# Patient Record
Sex: FEMALE | Race: WHITE | Employment: FULL TIME | URBAN - METROPOLITAN AREA
[De-identification: names, ages, dates, MRNs, and addresses within clinical notes are randomized per-mention and may not be internally consistent; named-entity substitution may affect disease eponyms.]

---

## 2020-01-17 ENCOUNTER — APPOINTMENT (OUTPATIENT)
Dept: LAB | Facility: CLINIC | Age: 50
End: 2020-01-17
Attending: OBSTETRICS & GYNECOLOGY
Payer: COMMERCIAL

## 2020-01-17 ENCOUNTER — OFFICE VISIT (OUTPATIENT)
Dept: OBGYN | Facility: CLINIC | Age: 50
End: 2020-01-17
Attending: OBSTETRICS & GYNECOLOGY
Payer: COMMERCIAL

## 2020-01-17 VITALS
BODY MASS INDEX: 29.86 KG/M2 | DIASTOLIC BLOOD PRESSURE: 91 MMHG | SYSTOLIC BLOOD PRESSURE: 138 MMHG | WEIGHT: 197 LBS | HEART RATE: 89 BPM | HEIGHT: 68 IN

## 2020-01-17 DIAGNOSIS — N93.9 ABNORMAL UTERINE BLEEDING (AUB): Primary | ICD-10-CM

## 2020-01-17 LAB
ERYTHROCYTE [DISTWIDTH] IN BLOOD BY AUTOMATED COUNT: 13.2 % (ref 10–15)
HCT VFR BLD AUTO: 41.9 % (ref 35–47)
HGB BLD-MCNC: 13.5 G/DL (ref 11.7–15.7)
MCH RBC QN AUTO: 31.7 PG (ref 26.5–33)
MCHC RBC AUTO-ENTMCNC: 32.2 G/DL (ref 31.5–36.5)
MCV RBC AUTO: 98 FL (ref 78–100)
PLATELET # BLD AUTO: 256 10E9/L (ref 150–450)
RBC # BLD AUTO: 4.26 10E12/L (ref 3.8–5.2)
TSH SERPL DL<=0.005 MIU/L-ACNC: 1.54 MU/L (ref 0.4–4)
WBC # BLD AUTO: 10.9 10E9/L (ref 4–11)

## 2020-01-17 PROCEDURE — 85027 COMPLETE CBC AUTOMATED: CPT | Performed by: OBSTETRICS & GYNECOLOGY

## 2020-01-17 PROCEDURE — 84443 ASSAY THYROID STIM HORMONE: CPT | Performed by: OBSTETRICS & GYNECOLOGY

## 2020-01-17 PROCEDURE — G0463 HOSPITAL OUTPT CLINIC VISIT: HCPCS | Mod: ZF

## 2020-01-17 PROCEDURE — 58100 BIOPSY OF UTERUS LINING: CPT | Mod: ZF | Performed by: OBSTETRICS & GYNECOLOGY

## 2020-01-17 PROCEDURE — 88305 TISSUE EXAM BY PATHOLOGIST: CPT | Performed by: OBSTETRICS & GYNECOLOGY

## 2020-01-17 PROCEDURE — 36415 COLL VENOUS BLD VENIPUNCTURE: CPT | Performed by: OBSTETRICS & GYNECOLOGY

## 2020-01-17 RX ORDER — MULTIPLE VITAMINS W/ MINERALS TAB 9MG-400MCG
1 TAB ORAL DAILY
COMMUNITY

## 2020-01-17 RX ORDER — LISINOPRIL 20 MG/1
20 TABLET ORAL
COMMUNITY
Start: 2019-12-12

## 2020-01-17 RX ORDER — TRETINOIN 0.25 MG/G
CREAM TOPICAL
COMMUNITY
Start: 2019-11-12

## 2020-01-17 RX ORDER — FLUOROURACIL 50 MG/G
CREAM TOPICAL
COMMUNITY
Start: 2019-09-23

## 2020-01-17 ASSESSMENT — ANXIETY QUESTIONNAIRES
1. FEELING NERVOUS, ANXIOUS, OR ON EDGE: NOT AT ALL
3. WORRYING TOO MUCH ABOUT DIFFERENT THINGS: NOT AT ALL
2. NOT BEING ABLE TO STOP OR CONTROL WORRYING: NOT AT ALL
GAD7 TOTAL SCORE: 0
IF YOU CHECKED OFF ANY PROBLEMS ON THIS QUESTIONNAIRE, HOW DIFFICULT HAVE THESE PROBLEMS MADE IT FOR YOU TO DO YOUR WORK, TAKE CARE OF THINGS AT HOME, OR GET ALONG WITH OTHER PEOPLE: NOT DIFFICULT AT ALL
7. FEELING AFRAID AS IF SOMETHING AWFUL MIGHT HAPPEN: NOT AT ALL
5. BEING SO RESTLESS THAT IT IS HARD TO SIT STILL: NOT AT ALL
6. BECOMING EASILY ANNOYED OR IRRITABLE: NOT AT ALL

## 2020-01-17 ASSESSMENT — PATIENT HEALTH QUESTIONNAIRE - PHQ9
5. POOR APPETITE OR OVEREATING: NOT AT ALL
SUM OF ALL RESPONSES TO PHQ QUESTIONS 1-9: 0

## 2020-01-17 ASSESSMENT — MIFFLIN-ST. JEOR: SCORE: 1567.09

## 2020-01-17 NOTE — LETTER
"2020       RE: Fay Amos  [address]  [place] [address]     Dear Colleague,    Thank you for referring your patient, Fay Amos, to the [facility] at [facility]. Please see a copy of my visit note below.    CC:  Consult for laparoscopy-assisted hysterectomy with bilateral salpingectomy, removal of Essure implants, and possible cystoscopy.    HPI:  Fay Amos is a 48 yo female,  with a PMH of HTN, presents to clinic for a surgical consult. Menses are irregular lasting 1-4 weeks with variable light to heavy bleeding. LMP not recorded, ~2w ago per pt. Pt reports taking an estrogen OCP; however, stopped taking ~2yr ago due to clotting risk. Menses have been irregular since. She had an ablation 11yrs and Essure implant.     Concerns:  1. Process, risks, and complications of surgery     Patients records are available and reviewed at today's visit.    Past GYN history: No STI   Last PAP smear:  10/21/2013 - Normal  Last TSH: 2018 - 1.83    Past Medical History:   Diagnosis Date     Hypertension        Past Surgical History:   Procedure Laterality Date     EYE SURGERY Left     Lazy eye correction       No family history on file.    Allergies: Erythromycin; Sulfa drugs; and Ciprofloxacin    Current Outpatient Medications   Medication Sig Dispense Refill     fluorouracil (EFUDEX) 5 % external cream Apply sparingly to the face two times a day for 3-4 weeks. Use plain Vaseline to aid in healing once treatment is compete.       lisinopril (PRINIVIL/ZESTRIL) 20 MG tablet Take 20 mg by mouth       multivitamin w/minerals (MULTI-VITAMIN) tablet Take 1 tablet by mouth daily       tretinoin (RETIN-A) 0.025 % external cream TOPICALLY APPLY A PEA SIZE AMOUNT TO FACE 20 MINUTS AFTER WASHING AT BEDTIME         ROS:  10-pt review of systems negative    EXAM:  Blood pressure (!) 138/91, pulse 89, height 1.727 m (5' 8\"), weight 89.4 kg (197 lb), not " currently breastfeeding.   BMI= Body mass index is 29.95 kg/m .     General - pleasant female in no acute distress.  Neck - supple without lymphadenopathy or thyromegaly.  Lungs - clear to auscultation bilaterally.  Heart - regular rate and rhythm without murmur.  Breast - Did not assess  Abdomen - soft, nontender, nondistended, no hepatosplenomegaly.  Pelvic - EG: normal adult female, BUS: within normal limits, Vagina: well rugated, no discharge, Cervix: no lesions or CMT, Uterus: firm, normal sized and nontender, Adnexae: no masses or tenderness.  Rectovaginal - deferred.  Musculoskeletal - no gross deformities.  Neurological - normal strength, sensation, and mental status.      ASSESSMENT:  Fay Amos is a 48 yo female,  with PMH of HTN, presents for a consult for abnormal uterine bleeding.  Patient desires definitive management and certain removal of Essure Implants.  Plan a laparoscopic hysterectomy with bilateral salpingectomy and possible cystoscopy.    Plan:  - Discussed treatment options, including non-surgical (Mirena) and surgical procedures (vaginal with laproscopic assistance vs robotic-assisted), including potential risks and side-effects.  Patient declines robotic surgery.  - Endometrial biopsy w/o cervical dilation to rule-out endometrial hyperplasia/cancer   - Labs: TSH w/free T4 reflex, CBC with Plt to identify possible anemia   -  Order US pelvic complete with transvaginal to be completed at outside clinic prior to surgery      Scotty Chen, MS3    Physician Attestation   I, Kalani Brady, was present with the medical student who participated in the service and in the documentation of the note.  I have verified the history and personally performed the physical exam and medical decision making.  I agree with the assessment and plan of care as documented in the note.      Items personally reviewed: vitals.    Kalani Brady MD    Endometrial Biopsy                                      "                                  Time Out - \"Pause for the Cause\"  Just before the procedure begins, through verbal and active participation of team members, verify:    Initials   Patient Name    mip   Patient Date of Birth mip   Procedure to be performed  mip   Site, laterality, level or multiples, noting patient position   mip   Relevant images or diagnostics available/displayed   mip   Implants, special equipment or special requirements        mip     Consent:  Verbal consent obtained from patient. , Risks, benefits of treatment, and no treatment were discussed.  Patient's questions were elicited and answered.  and Written consent signed and scanned into medical record.    Indication: metrorhagia  Faculty: Jose Antonio    Using a medium Graves speculum, the cervix was visualized. The cervix was prepped with Betadine.  A single tooth tenaculum was applied to the anterior lip of the cervix. The endometrial pipelle was advanced through the cervix without difficulty and a sample collected. No additional pass was made.  The tenaculum was removed from the cervix and the tenaculum site made hemostatic with Silver Nitrate sticks .    EBL: minimal  Complications:  None apparent  Pathology: EMB sample was sent to pathology.  Tolerance of Procedure:  Patient did tolerate the procedure well.     Patient was instructed to call if she experiences any heavy bleeding, severe cramping, or abnormal vaginal discharge.  May take ibuprofen 400-800 mg PO TID PRN or naproxen 500 mg PO BID for cramping.    Will notify patient of results.    Kalani Brady MD         "

## 2020-01-17 NOTE — NURSING NOTE
Chief Complaint   Patient presents with     Consult     Hysterectomy consult       See RIAN Wylie 1/17/2020

## 2020-01-17 NOTE — PROGRESS NOTES
"CC:  Consult for laparoscopy-assisted hysterectomy with bilateral salpingectomy, removal of Essure implants, and possible cystoscopy.    HPI:  Fay Amos is a 48 yo female,  with a PMH of HTN, presents to clinic for a surgical consult. Menses are irregular lasting 1-4 weeks with variable light to heavy bleeding. LMP not recorded, ~2w ago per pt. Pt reports taking an estrogen OCP; however, stopped taking ~2yr ago due to clotting risk. Menses have been irregular since. She had an ablation 11yrs and Essure implant.     Concerns:  1. Process, risks, and complications of surgery     Patients records are available and reviewed at today's visit.    Past GYN history: No STI   Last PAP smear:  10/21/2013 - Normal  Last TSH: 2018 - 1.83    Past Medical History:   Diagnosis Date     Hypertension        Past Surgical History:   Procedure Laterality Date     EYE SURGERY Left     Lazy eye correction       No family history on file.    Allergies: Erythromycin; Sulfa drugs; and Ciprofloxacin    Current Outpatient Medications   Medication Sig Dispense Refill     fluorouracil (EFUDEX) 5 % external cream Apply sparingly to the face two times a day for 3-4 weeks. Use plain Vaseline to aid in healing once treatment is compete.       lisinopril (PRINIVIL/ZESTRIL) 20 MG tablet Take 20 mg by mouth       multivitamin w/minerals (MULTI-VITAMIN) tablet Take 1 tablet by mouth daily       tretinoin (RETIN-A) 0.025 % external cream TOPICALLY APPLY A PEA SIZE AMOUNT TO FACE 20 MINUTS AFTER WASHING AT BEDTIME         ROS:  10-pt review of systems negative    EXAM:  Blood pressure (!) 138/91, pulse 89, height 1.727 m (5' 8\"), weight 89.4 kg (197 lb), not currently breastfeeding.   BMI= Body mass index is 29.95 kg/m .     General - pleasant female in no acute distress.  Neck - supple without lymphadenopathy or thyromegaly.  Lungs - clear to auscultation bilaterally.  Heart - regular rate and rhythm without murmur.  Breast - Did not " "assess  Abdomen - soft, nontender, nondistended, no hepatosplenomegaly.  Pelvic - EG: normal adult female, BUS: within normal limits, Vagina: well rugated, no discharge, Cervix: no lesions or CMT, Uterus: firm, normal sized and nontender, Adnexae: no masses or tenderness.  Rectovaginal - deferred.  Musculoskeletal - no gross deformities.  Neurological - normal strength, sensation, and mental status.      ASSESSMENT:  Fay Amos is a 48 yo female,  with PMH of HTN, presents for a consult for abnormal uterine bleeding.  Patient desires definitive management and certain removal of Essure Implants.  Plan a laparoscopic hysterectomy with bilateral salpingectomy and possible cystoscopy.    Plan:  - Discussed treatment options, including non-surgical (Mirena) and surgical procedures (vaginal with laproscopic assistance vs robotic-assisted), including potential risks and side-effects.  Patient declines robotic surgery.  - Endometrial biopsy w/o cervical dilation to rule-out endometrial hyperplasia/cancer   - Labs: TSH w/free T4 reflex, CBC with Plt to identify possible anemia   -  Order US pelvic complete with transvaginal to be completed at outside clinic prior to surgery      Scotty Chen, MS3    Physician Attestation   I, Kalani Brady, was present with the medical student who participated in the service and in the documentation of the note.  I have verified the history and personally performed the physical exam and medical decision making.  I agree with the assessment and plan of care as documented in the note.      Items personally reviewed: vitals.    Kalani Brady MD    Endometrial Biopsy                                                                       Time Out - \"Pause for the Cause\"  Just before the procedure begins, through verbal and active participation of team members, verify:    Initials   Patient Name    mip   Patient Date of Birth mip   Procedure to be performed  mip   Site, laterality, " level or multiples, noting patient position   mip   Relevant images or diagnostics available/displayed   mip   Implants, special equipment or special requirements        mip     Consent:  Verbal consent obtained from patient. , Risks, benefits of treatment, and no treatment were discussed.  Patient's questions were elicited and answered.  and Written consent signed and scanned into medical record.    Indication: metrorhagia  Faculty: Jose Antonio      Using a medium Graves speculum, the cervix was visualized. The cervix was prepped with Betadine.  A single tooth tenaculum was applied to the anterior lip of the cervix. The endometrial pipelle was advanced through the cervix without difficulty and a sample collected. No additional pass was made.  The tenaculum was removed from the cervix and the tenaculum site made hemostatic with Silver Nitrate sticks .    EBL: minimal  Complications:  None apparent  Pathology: EMB sample was sent to pathology.  Tolerance of Procedure:  Patient did tolerate the procedure well.     Patient was instructed to call if she experiences any heavy bleeding, severe cramping, or abnormal vaginal discharge.  May take ibuprofen 400-800 mg PO TID PRN or naproxen 500 mg PO BID for cramping.    Will notify patient of results.    Kalani Brady MD

## 2020-01-18 ASSESSMENT — ANXIETY QUESTIONNAIRES: GAD7 TOTAL SCORE: 0

## 2020-01-20 ENCOUNTER — TELEPHONE (OUTPATIENT)
Dept: OBGYN | Facility: CLINIC | Age: 50
End: 2020-01-20

## 2020-01-20 PROBLEM — N93.9 ABNORMAL UTERINE BLEEDING (AUB): Status: ACTIVE | Noted: 2020-01-20

## 2020-01-20 NOTE — TELEPHONE ENCOUNTER
Confirmed surgery date, time and location 2/21/20 with arrival time at 7:30a.m with nothing to eat eight hours before scheduled surgery time and clear liquids up to two hours before scheduled surgery time, informed patient that a surgery map and letter will be mailed out.     to complete the following fields:            CHECKLIST     Google Calendar : Yes     Resident notified:Not Applicable     Clinic schedule blocked: Not Applicable    Patient notified:Yes      Pre op information sent: Yes     Given to patient over the phone.Yes    Comments:

## 2020-01-21 LAB — COPATH REPORT: NORMAL

## 2020-01-23 ENCOUNTER — TRANSFERRED RECORDS (OUTPATIENT)
Dept: HEALTH INFORMATION MANAGEMENT | Facility: CLINIC | Age: 50
End: 2020-01-23

## 2020-02-20 ENCOUNTER — ANESTHESIA EVENT (OUTPATIENT)
Dept: SURGERY | Facility: CLINIC | Age: 50
End: 2020-02-20
Payer: COMMERCIAL

## 2020-02-20 RX ORDER — ACETAMINOPHEN 325 MG/1
325-650 TABLET ORAL EVERY 6 HOURS PRN
Status: ON HOLD | COMMUNITY
End: 2020-02-21

## 2020-02-20 RX ORDER — OMEGA-3 FATTY ACIDS/FISH OIL 300-1000MG
200 CAPSULE ORAL EVERY 4 HOURS PRN
Status: ON HOLD | COMMUNITY
End: 2020-02-21

## 2020-02-20 RX ADMIN — BUPIVACAINE HYDROCHLORIDE AND EPINEPHRINE BITARTRATE 60 ML: 2.5; .005 INJECTION, SOLUTION INFILTRATION; PERINEURAL at 08:59

## 2020-02-21 ENCOUNTER — ANESTHESIA (OUTPATIENT)
Dept: SURGERY | Facility: CLINIC | Age: 50
End: 2020-02-21
Payer: COMMERCIAL

## 2020-02-21 ENCOUNTER — ANCILLARY PROCEDURE (OUTPATIENT)
Dept: ULTRASOUND IMAGING | Facility: CLINIC | Age: 50
End: 2020-02-21
Attending: ANESTHESIOLOGY
Payer: COMMERCIAL

## 2020-02-21 ENCOUNTER — HOSPITAL ENCOUNTER (OUTPATIENT)
Facility: CLINIC | Age: 50
Discharge: HOME | End: 2020-02-21
Attending: OBSTETRICS & GYNECOLOGY | Admitting: OBSTETRICS & GYNECOLOGY
Payer: COMMERCIAL

## 2020-02-21 ENCOUNTER — DOCUMENTATION ONLY (OUTPATIENT)
Dept: OTHER | Facility: CLINIC | Age: 50
End: 2020-02-21

## 2020-02-21 VITALS
HEART RATE: 84 BPM | OXYGEN SATURATION: 97 % | HEIGHT: 68 IN | DIASTOLIC BLOOD PRESSURE: 86 MMHG | SYSTOLIC BLOOD PRESSURE: 137 MMHG | RESPIRATION RATE: 11 BRPM | WEIGHT: 187.17 LBS | TEMPERATURE: 98.1 F | BODY MASS INDEX: 28.37 KG/M2

## 2020-02-21 DIAGNOSIS — N93.9 ABNORMAL UTERINE BLEEDING (AUB): ICD-10-CM

## 2020-02-21 LAB
ABO + RH BLD: NORMAL
ABO + RH BLD: NORMAL
BLD GP AB SCN SERPL QL: NORMAL
BLOOD BANK CMNT PATIENT-IMP: NORMAL
GLUCOSE BLDC GLUCOMTR-MCNC: 88 MG/DL (ref 70–99)
HCG UR QL: NEGATIVE
HGB BLD-MCNC: 13.3 G/DL (ref 11.7–15.7)
SPECIMEN EXP DATE BLD: NORMAL

## 2020-02-21 PROCEDURE — 82962 GLUCOSE BLOOD TEST: CPT

## 2020-02-21 PROCEDURE — 27210794 ZZH OR GENERAL SUPPLY STERILE: Performed by: OBSTETRICS & GYNECOLOGY

## 2020-02-21 PROCEDURE — 25000128 H RX IP 250 OP 636: Performed by: ANESTHESIOLOGY

## 2020-02-21 PROCEDURE — 86850 RBC ANTIBODY SCREEN: CPT | Performed by: OBSTETRICS & GYNECOLOGY

## 2020-02-21 PROCEDURE — 25000125 ZZHC RX 250: Performed by: OBSTETRICS & GYNECOLOGY

## 2020-02-21 PROCEDURE — 71000015 ZZH RECOVERY PHASE 1 LEVEL 2 EA ADDTL HR: Performed by: OBSTETRICS & GYNECOLOGY

## 2020-02-21 PROCEDURE — 25000125 ZZHC RX 250: Performed by: NURSE ANESTHETIST, CERTIFIED REGISTERED

## 2020-02-21 PROCEDURE — 25000132 ZZH RX MED GY IP 250 OP 250 PS 637: Performed by: OBSTETRICS & GYNECOLOGY

## 2020-02-21 PROCEDURE — 25000132 ZZH RX MED GY IP 250 OP 250 PS 637: Performed by: ANESTHESIOLOGY

## 2020-02-21 PROCEDURE — 36000062 ZZH SURGERY LEVEL 4 1ST 30 MIN - UMMC: Performed by: OBSTETRICS & GYNECOLOGY

## 2020-02-21 PROCEDURE — 36415 COLL VENOUS BLD VENIPUNCTURE: CPT | Performed by: OBSTETRICS & GYNECOLOGY

## 2020-02-21 PROCEDURE — 25000128 H RX IP 250 OP 636: Performed by: NURSE ANESTHETIST, CERTIFIED REGISTERED

## 2020-02-21 PROCEDURE — 25000565 ZZH ISOFLURANE, EA 15 MIN: Performed by: OBSTETRICS & GYNECOLOGY

## 2020-02-21 PROCEDURE — 37000008 ZZH ANESTHESIA TECHNICAL FEE, 1ST 30 MIN: Performed by: OBSTETRICS & GYNECOLOGY

## 2020-02-21 PROCEDURE — 25000132 ZZH RX MED GY IP 250 OP 250 PS 637: Performed by: STUDENT IN AN ORGANIZED HEALTH CARE EDUCATION/TRAINING PROGRAM

## 2020-02-21 PROCEDURE — 86900 BLOOD TYPING SEROLOGIC ABO: CPT | Performed by: OBSTETRICS & GYNECOLOGY

## 2020-02-21 PROCEDURE — 86901 BLOOD TYPING SEROLOGIC RH(D): CPT | Performed by: OBSTETRICS & GYNECOLOGY

## 2020-02-21 PROCEDURE — 88307 TISSUE EXAM BY PATHOLOGIST: CPT | Performed by: OBSTETRICS & GYNECOLOGY

## 2020-02-21 PROCEDURE — 40000170 ZZH STATISTIC PRE-PROCEDURE ASSESSMENT II: Performed by: OBSTETRICS & GYNECOLOGY

## 2020-02-21 PROCEDURE — 25800030 ZZH RX IP 258 OP 636: Performed by: NURSE ANESTHETIST, CERTIFIED REGISTERED

## 2020-02-21 PROCEDURE — 36000064 ZZH SURGERY LEVEL 4 EA 15 ADDTL MIN - UMMC: Performed by: OBSTETRICS & GYNECOLOGY

## 2020-02-21 PROCEDURE — 85018 HEMOGLOBIN: CPT | Performed by: OBSTETRICS & GYNECOLOGY

## 2020-02-21 PROCEDURE — 71000027 ZZH RECOVERY PHASE 2 EACH 15 MINS: Performed by: OBSTETRICS & GYNECOLOGY

## 2020-02-21 PROCEDURE — 25000125 ZZHC RX 250: Performed by: ANESTHESIOLOGY

## 2020-02-21 PROCEDURE — 25000128 H RX IP 250 OP 636: Performed by: OBSTETRICS & GYNECOLOGY

## 2020-02-21 PROCEDURE — 37000009 ZZH ANESTHESIA TECHNICAL FEE, EACH ADDTL 15 MIN: Performed by: OBSTETRICS & GYNECOLOGY

## 2020-02-21 PROCEDURE — 81025 URINE PREGNANCY TEST: CPT | Performed by: OBSTETRICS & GYNECOLOGY

## 2020-02-21 PROCEDURE — 71000014 ZZH RECOVERY PHASE 1 LEVEL 2 FIRST HR: Performed by: OBSTETRICS & GYNECOLOGY

## 2020-02-21 RX ORDER — CEFAZOLIN SODIUM 2 G/100ML
2 INJECTION, SOLUTION INTRAVENOUS
Status: COMPLETED | OUTPATIENT
Start: 2020-02-21 | End: 2020-02-21

## 2020-02-21 RX ORDER — CEFAZOLIN SODIUM 1 G/3ML
1 INJECTION, POWDER, FOR SOLUTION INTRAMUSCULAR; INTRAVENOUS SEE ADMIN INSTRUCTIONS
Status: DISCONTINUED | OUTPATIENT
Start: 2020-02-21 | End: 2020-02-21 | Stop reason: HOSPADM

## 2020-02-21 RX ORDER — LIDOCAINE 40 MG/G
CREAM TOPICAL
Status: DISCONTINUED | OUTPATIENT
Start: 2020-02-21 | End: 2020-02-21

## 2020-02-21 RX ORDER — DEXAMETHASONE SODIUM PHOSPHATE 4 MG/ML
INJECTION, SOLUTION INTRA-ARTICULAR; INTRALESIONAL; INTRAMUSCULAR; INTRAVENOUS; SOFT TISSUE PRN
Status: DISCONTINUED | OUTPATIENT
Start: 2020-02-21 | End: 2020-02-21

## 2020-02-21 RX ORDER — FENTANYL CITRATE 50 UG/ML
25-50 INJECTION, SOLUTION INTRAMUSCULAR; INTRAVENOUS
Status: DISCONTINUED | OUTPATIENT
Start: 2020-02-21 | End: 2020-02-21 | Stop reason: HOSPADM

## 2020-02-21 RX ORDER — FENTANYL CITRATE 50 UG/ML
INJECTION, SOLUTION INTRAMUSCULAR; INTRAVENOUS PRN
Status: DISCONTINUED | OUTPATIENT
Start: 2020-02-21 | End: 2020-02-21

## 2020-02-21 RX ORDER — SODIUM CHLORIDE, SODIUM LACTATE, POTASSIUM CHLORIDE, CALCIUM CHLORIDE 600; 310; 30; 20 MG/100ML; MG/100ML; MG/100ML; MG/100ML
INJECTION, SOLUTION INTRAVENOUS CONTINUOUS
Status: CANCELLED | OUTPATIENT
Start: 2020-02-21

## 2020-02-21 RX ORDER — ONDANSETRON 4 MG/1
4 TABLET, ORALLY DISINTEGRATING ORAL
Status: DISCONTINUED | OUTPATIENT
Start: 2020-02-21 | End: 2020-02-21 | Stop reason: HOSPADM

## 2020-02-21 RX ORDER — OXYCODONE HYDROCHLORIDE 5 MG/1
5 TABLET ORAL EVERY 6 HOURS PRN
Qty: 12 TABLET | Refills: 0 | Status: SHIPPED | OUTPATIENT
Start: 2020-02-21 | End: 2020-10-29

## 2020-02-21 RX ORDER — ONDANSETRON 4 MG/1
4 TABLET, ORALLY DISINTEGRATING ORAL EVERY 30 MIN PRN
Status: DISCONTINUED | OUTPATIENT
Start: 2020-02-21 | End: 2020-02-21 | Stop reason: HOSPADM

## 2020-02-21 RX ORDER — ALBUTEROL SULFATE 0.83 MG/ML
2.5 SOLUTION RESPIRATORY (INHALATION) EVERY 4 HOURS PRN
Status: DISCONTINUED | OUTPATIENT
Start: 2020-02-21 | End: 2020-02-21 | Stop reason: HOSPADM

## 2020-02-21 RX ORDER — ACETAMINOPHEN 500 MG
500-1000 TABLET ORAL EVERY 6 HOURS PRN
COMMUNITY
Start: 2020-02-21 | End: 2020-10-29

## 2020-02-21 RX ORDER — PROPOFOL 10 MG/ML
INJECTION, EMULSION INTRAVENOUS PRN
Status: DISCONTINUED | OUTPATIENT
Start: 2020-02-21 | End: 2020-02-21

## 2020-02-21 RX ORDER — DEXAMETHASONE SODIUM PHOSPHATE 10 MG/ML
INJECTION, SOLUTION INTRAMUSCULAR; INTRAVENOUS PRN
Status: DISCONTINUED | OUTPATIENT
Start: 2020-02-21 | End: 2020-02-21

## 2020-02-21 RX ORDER — IBUPROFEN 600 MG/1
600 TABLET, FILM COATED ORAL EVERY 6 HOURS PRN
Qty: 30 TABLET | Refills: 0 | COMMUNITY
Start: 2020-02-21 | End: 2020-10-29

## 2020-02-21 RX ORDER — BUPIVACAINE HYDROCHLORIDE AND EPINEPHRINE 2.5; 5 MG/ML; UG/ML
INJECTION, SOLUTION INFILTRATION; PERINEURAL PRN
Status: DISCONTINUED | OUTPATIENT
Start: 2020-02-20 | End: 2020-02-21

## 2020-02-21 RX ORDER — ACETAMINOPHEN 325 MG/1
650 TABLET ORAL EVERY 4 HOURS PRN
COMMUNITY
Start: 2020-02-21 | End: 2020-02-21

## 2020-02-21 RX ORDER — ACETAMINOPHEN 325 MG/1
975 TABLET ORAL
Status: DISCONTINUED | OUTPATIENT
Start: 2020-02-21 | End: 2020-02-21 | Stop reason: HOSPADM

## 2020-02-21 RX ORDER — KETOROLAC TROMETHAMINE 30 MG/ML
INJECTION, SOLUTION INTRAMUSCULAR; INTRAVENOUS PRN
Status: DISCONTINUED | OUTPATIENT
Start: 2020-02-21 | End: 2020-02-21

## 2020-02-21 RX ORDER — ACETAMINOPHEN 325 MG/1
975 TABLET ORAL ONCE
Status: COMPLETED | OUTPATIENT
Start: 2020-02-21 | End: 2020-02-21

## 2020-02-21 RX ORDER — NALOXONE HYDROCHLORIDE 0.4 MG/ML
.1-.4 INJECTION, SOLUTION INTRAMUSCULAR; INTRAVENOUS; SUBCUTANEOUS
Status: DISCONTINUED | OUTPATIENT
Start: 2020-02-21 | End: 2020-02-21 | Stop reason: HOSPADM

## 2020-02-21 RX ORDER — FLUMAZENIL 0.1 MG/ML
0.2 INJECTION, SOLUTION INTRAVENOUS
Status: DISCONTINUED | OUTPATIENT
Start: 2020-02-21 | End: 2020-02-21 | Stop reason: HOSPADM

## 2020-02-21 RX ORDER — SODIUM CHLORIDE, SODIUM LACTATE, POTASSIUM CHLORIDE, CALCIUM CHLORIDE 600; 310; 30; 20 MG/100ML; MG/100ML; MG/100ML; MG/100ML
INJECTION, SOLUTION INTRAVENOUS CONTINUOUS
Status: DISCONTINUED | OUTPATIENT
Start: 2020-02-21 | End: 2020-02-21 | Stop reason: HOSPADM

## 2020-02-21 RX ORDER — PHENAZOPYRIDINE HYDROCHLORIDE 200 MG/1
200 TABLET, FILM COATED ORAL ONCE
Status: COMPLETED | OUTPATIENT
Start: 2020-02-21 | End: 2020-02-21

## 2020-02-21 RX ORDER — LIDOCAINE HYDROCHLORIDE AND EPINEPHRINE 10; 10 MG/ML; UG/ML
INJECTION, SOLUTION INFILTRATION; PERINEURAL PRN
Status: DISCONTINUED | OUTPATIENT
Start: 2020-02-21 | End: 2020-02-21 | Stop reason: HOSPADM

## 2020-02-21 RX ORDER — HYDRALAZINE HYDROCHLORIDE 20 MG/ML
2.5-5 INJECTION INTRAMUSCULAR; INTRAVENOUS EVERY 10 MIN PRN
Status: DISCONTINUED | OUTPATIENT
Start: 2020-02-21 | End: 2020-02-21 | Stop reason: HOSPADM

## 2020-02-21 RX ORDER — METOPROLOL TARTRATE 1 MG/ML
1-2 INJECTION, SOLUTION INTRAVENOUS EVERY 5 MIN PRN
Status: DISCONTINUED | OUTPATIENT
Start: 2020-02-21 | End: 2020-02-21 | Stop reason: HOSPADM

## 2020-02-21 RX ORDER — ONDANSETRON 2 MG/ML
INJECTION INTRAMUSCULAR; INTRAVENOUS PRN
Status: DISCONTINUED | OUTPATIENT
Start: 2020-02-21 | End: 2020-02-21

## 2020-02-21 RX ORDER — LIDOCAINE HYDROCHLORIDE 20 MG/ML
INJECTION, SOLUTION INFILTRATION; PERINEURAL PRN
Status: DISCONTINUED | OUTPATIENT
Start: 2020-02-21 | End: 2020-02-21

## 2020-02-21 RX ORDER — GABAPENTIN 300 MG/1
300 CAPSULE ORAL
Status: COMPLETED | OUTPATIENT
Start: 2020-02-21 | End: 2020-02-21

## 2020-02-21 RX ORDER — SODIUM CHLORIDE, SODIUM LACTATE, POTASSIUM CHLORIDE, CALCIUM CHLORIDE 600; 310; 30; 20 MG/100ML; MG/100ML; MG/100ML; MG/100ML
INJECTION, SOLUTION INTRAVENOUS CONTINUOUS PRN
Status: DISCONTINUED | OUTPATIENT
Start: 2020-02-21 | End: 2020-02-21

## 2020-02-21 RX ORDER — AMOXICILLIN 250 MG
1-2 CAPSULE ORAL 2 TIMES DAILY
Qty: 30 TABLET | Refills: 0 | COMMUNITY
Start: 2020-02-21 | End: 2020-10-29

## 2020-02-21 RX ORDER — OXYCODONE HYDROCHLORIDE 5 MG/1
5 TABLET ORAL
Status: COMPLETED | OUTPATIENT
Start: 2020-02-21 | End: 2020-02-21

## 2020-02-21 RX ORDER — MEPERIDINE HYDROCHLORIDE 25 MG/ML
12.5 INJECTION INTRAMUSCULAR; INTRAVENOUS; SUBCUTANEOUS
Status: DISCONTINUED | OUTPATIENT
Start: 2020-02-21 | End: 2020-02-21 | Stop reason: HOSPADM

## 2020-02-21 RX ORDER — HYDROMORPHONE HYDROCHLORIDE 1 MG/ML
.3-.5 INJECTION, SOLUTION INTRAMUSCULAR; INTRAVENOUS; SUBCUTANEOUS EVERY 10 MIN PRN
Status: DISCONTINUED | OUTPATIENT
Start: 2020-02-21 | End: 2020-02-21 | Stop reason: HOSPADM

## 2020-02-21 RX ORDER — ACETAMINOPHEN 325 MG/1
650 TABLET ORAL
Status: DISCONTINUED | OUTPATIENT
Start: 2020-02-21 | End: 2020-02-21 | Stop reason: HOSPADM

## 2020-02-21 RX ORDER — ONDANSETRON 2 MG/ML
4 INJECTION INTRAMUSCULAR; INTRAVENOUS EVERY 30 MIN PRN
Status: DISCONTINUED | OUTPATIENT
Start: 2020-02-21 | End: 2020-02-21 | Stop reason: HOSPADM

## 2020-02-21 RX ORDER — DIMENHYDRINATE 50 MG/ML
25 INJECTION, SOLUTION INTRAMUSCULAR; INTRAVENOUS
Status: DISCONTINUED | OUTPATIENT
Start: 2020-02-21 | End: 2020-02-21 | Stop reason: HOSPADM

## 2020-02-21 RX ADMIN — HYDROMORPHONE HYDROCHLORIDE 0.5 MG: 1 INJECTION, SOLUTION INTRAMUSCULAR; INTRAVENOUS; SUBCUTANEOUS at 10:22

## 2020-02-21 RX ADMIN — DEXAMETHASONE SODIUM PHOSPHATE 8 MG: 4 INJECTION, SOLUTION INTRAMUSCULAR; INTRAVENOUS at 09:46

## 2020-02-21 RX ADMIN — OXYCODONE HYDROCHLORIDE 5 MG: 5 TABLET ORAL at 14:03

## 2020-02-21 RX ADMIN — DEXMEDETOMIDINE 40 MCG: 100 INJECTION, SOLUTION, CONCENTRATE INTRAVENOUS at 08:59

## 2020-02-21 RX ADMIN — FENTANYL CITRATE 100 MCG: 50 INJECTION, SOLUTION INTRAMUSCULAR; INTRAVENOUS at 09:20

## 2020-02-21 RX ADMIN — CEFAZOLIN 2 G: 10 INJECTION, POWDER, FOR SOLUTION INTRAVENOUS at 09:35

## 2020-02-21 RX ADMIN — GABAPENTIN 300 MG: 300 CAPSULE ORAL at 08:10

## 2020-02-21 RX ADMIN — KETOROLAC TROMETHAMINE 30 MG: 30 INJECTION, SOLUTION INTRAMUSCULAR at 11:45

## 2020-02-21 RX ADMIN — FENTANYL CITRATE 50 MCG: 50 INJECTION, SOLUTION INTRAMUSCULAR; INTRAVENOUS at 08:56

## 2020-02-21 RX ADMIN — PROPOFOL 170 MG: 10 INJECTION, EMULSION INTRAVENOUS at 09:25

## 2020-02-21 RX ADMIN — SUGAMMADEX 200 MG: 100 INJECTION, SOLUTION INTRAVENOUS at 11:45

## 2020-02-21 RX ADMIN — SODIUM CHLORIDE, POTASSIUM CHLORIDE, SODIUM LACTATE AND CALCIUM CHLORIDE: 600; 310; 30; 20 INJECTION, SOLUTION INTRAVENOUS at 10:05

## 2020-02-21 RX ADMIN — ACETAMINOPHEN 650 MG: 325 TABLET, FILM COATED ORAL at 14:03

## 2020-02-21 RX ADMIN — DEXAMETHASONE SODIUM PHOSPHATE 2 MG: 10 INJECTION, SOLUTION INTRAMUSCULAR; INTRAVENOUS at 08:59

## 2020-02-21 RX ADMIN — SODIUM CHLORIDE, POTASSIUM CHLORIDE, SODIUM LACTATE AND CALCIUM CHLORIDE: 600; 310; 30; 20 INJECTION, SOLUTION INTRAVENOUS at 09:12

## 2020-02-21 RX ADMIN — ACETAMINOPHEN 975 MG: 325 TABLET, FILM COATED ORAL at 08:09

## 2020-02-21 RX ADMIN — ROCURONIUM BROMIDE 20 MG: 10 INJECTION INTRAVENOUS at 09:46

## 2020-02-21 RX ADMIN — PHENAZOPYRIDINE HYDROCHLORIDE 200 MG: 200 TABLET, FILM COATED ORAL at 08:10

## 2020-02-21 RX ADMIN — MIDAZOLAM HYDROCHLORIDE 1 MG: 1 INJECTION, SOLUTION INTRAMUSCULAR; INTRAVENOUS at 08:56

## 2020-02-21 RX ADMIN — ONDANSETRON 4 MG: 2 INJECTION INTRAMUSCULAR; INTRAVENOUS at 11:52

## 2020-02-21 RX ADMIN — LIDOCAINE HYDROCHLORIDE 60 MG: 20 INJECTION, SOLUTION INFILTRATION; PERINEURAL at 09:25

## 2020-02-21 RX ADMIN — ROCURONIUM BROMIDE 50 MG: 10 INJECTION INTRAVENOUS at 09:25

## 2020-02-21 RX ADMIN — MIDAZOLAM 2 MG: 1 INJECTION INTRAMUSCULAR; INTRAVENOUS at 09:12

## 2020-02-21 ASSESSMENT — MIFFLIN-ST. JEOR: SCORE: 1522.37

## 2020-02-21 NOTE — ANESTHESIA POSTPROCEDURE EVALUATION
Anesthesia POST Procedure Evaluation    Patient: Fay Amos   MRN:     [id number] Gender:   female   Age:    49 year old :      1970        Preoperative Diagnosis: Abnormal uterine bleeding (AUB) [N93.9]   Procedure(s):  HYSTERECTOMY, VAGINAL, LAPAROSCOPY-ASSISTED, BILATERAL SALPINGECTOMY  CYSTOSCOPY  REMOVAL OF ESSURE IMPLANTS   Postop Comments: No value filed.     Anesthesia Type: General       Disposition: Outpatient   Postop Pain Control: Uneventful            Sign Out: Well controlled pain   PONV: No   Neuro/Psych: Uneventful            Sign Out: Acceptable/Baseline neuro status   Airway/Respiratory: Uneventful            Sign Out: Acceptable/Baseline resp. status   CV/Hemodynamics: Uneventful            Sign Out: Acceptable CV status   Other NRE: NONE   DID A NON-ROUTINE EVENT OCCUR? No         Last Anesthesia Record Vitals:  CRNA VITALS  2020 1132 - 2020 1232      2020             Temp:  36.8  C (98.2  F)          Last PACU Vitals:  Vitals Value Taken Time   /87 2020  1:15 PM   Temp 36.7  C (98.1  F) 2020  1:00 PM   Pulse 80 2020  1:15 PM   Resp 14 2020  1:20 PM   SpO2 98 % 2020  1:20 PM   Temp src     NIBP     Pulse     SpO2     Resp     Temp     Ht Rate     Temp 2     Vitals shown include unvalidated device data.      Electronically Signed By: Soraya Menard MD, 2020, 1:22 PM

## 2020-02-21 NOTE — OP NOTE
Operative Note  20    Patient: Fay Amos  : 1970  MRN: [id number]    Procedure Name: Laparoscopic Assisted Vaginal Hysterectomy, Bilateral Salpingectomy, Removal of Essure Implants (In situ within the fallopian tubes), Cystoscopy    Surgeon(s) and Role:     * Kalani Brady MD - Primary     * Johana Sandy MD - Resident - Assisting    Anesthesia:     General  IVF: 1300mL     Estimated blood loss:  200 ml  Drains: None  UOP: 100mL      Specimens:     Uterus, cervix, bilateral fallopian tubes.   Findings:                     On EUA, patient had an 8w sized mobile anteverted uterus. Normal external genitalia, vagina, cervix. On laparoscopy, no injury to underlying viscera. Her liver, bowel, tubes and ovaries were grossly normal in appearance.  There were no adhesions in the pelvis.  Bilateral ureters were visualized and noted to be clear of the area of surgical excision. On cystoscopy, efflux from bilateral ureters visualized. Normal bladder mucosa without injury. Surgical sites hemostatic at the end of the case.     Indications: Fay Amos is a 49 year old with history of abnormal uterine bleeding. She recently stopped OCPs due to risk of DVT. She has history of Essure placement and endometrial ablation 11 years ago. A pelivc US showed adenomyosis. Endometrial biopsy showed late secretory endometrium, negative for hyperplasia or atypia. Risks/benefits/alternatives to above procedure were discussed. Signed informed consent was obtained.     Procedure Details:  Patient was brought to the operating room where adequate general anesthesia was administered after SCDs were in place. She was placed in the dorsal lithotomy position, and exam under anesthesia revealed the findings noted above. She was prepped and draped in the usual sterile fashion, and gordon catheter was placed. A speculum was placed and the cervix was visualized, grasped at 12 o'clock with single tooth tenaculum. An acorn manipulator  was placed. Attention was turned to the abdomen. A 5mm incision was made in the umbilicus. A Veress needle was placed and intraabdominal placement confirmed with saline drop test. Gas attached and the abdomen was then insufflated with CO2 gas to a maximum pressure of 15 mmHg. A 5mm port was placed into the abdomen. The laparoscope was placed, and a survey of the abdomen revealed the findings noted above. No trauma from entry was noted. The patient was placed in Trendelenburg. Attention was turned to the right lower quadrant. At an avascular point in the RLQ a 5 mm incision was made and a 5 mm trocar was placed under direct visualization.  Attention was then turned to the left lower quadrant, a 5 mm incision was made, and a 5 mm trocar was placed under direct visualization.    At this point, the right ureter was identified transperitoneally, and noted to peristalse. The right fallopian tube was grasped and transected from underlying peritoneum using LigaSure device. The right uteroovarian ligament was grasped and transected. Then, the right round ligament was grasped and transected with caution to avoid bladder peritoneum. The left ureter was then similarly identified and noted to peristalse. The left fallopian tube was grasped and transected from underlying peritoneum using LigaSure device. The left uteroovarian ligament was grasped and transected. Then, the left round ligament was grasped and transected with caution to avoid bladder peritoneum.     Laparoscope and instruments were removed from abdomen. Attention was then turned to the vagina for removal of the uterus. CO2 gas turned off.  The abdomen was covered with a sterile drape. A weighted speculum placed in posterior vagina. The cervix was grasped on posterior aspect with a second teneculum. The manipulator was removed. The cervix was infiltrated in a circumferential fashion with 1% lidocaine with epinephrine. The vesical fold which circumferentially  surrounded the cervix was identified. Using electrocautery, an incision was made until the endopelvic fascia was identified.  By both blunt and sharp dissection, the anterior and posterior cul-de-sac were mobilized. Initially, the anterior cul-du-sac was dissected with Metzenbaum scissors. Attention was turned posteriorly. The posterior cul-de-sac was dissected and entered with Desouza scissors. The posterior peritoneum was bluntly extended,  and a longer, ski slope, weighted speculum was placed in the posterior cul-de-sac. Bleeding was noted at the right uterine artery. Therefore, the right uterosacral ligament was clamped with Raymond clamp, cut and suture ligated with 0-Vicryl. This was repeated on the left. Attention was turned to the anterior cul-du-sac. The anterior peritoneum was identified and grasped and cut with Metzenbaum scissors. This incision was extended bluntly. A [place] retractor placed intraperitoneally. Then, the cardinal ligaments were then clamped, cut and suture ligated bilaterally. The uterine vessels were clamped, cut and suture ligated. The posterior uterus was grasped with teneculum, and cervix was flipped inferiorly. One additional pedicile on the right was clamped, cut, and ligated. The specimen was removed from the field. Essure implants palpated in the bilateral fallopian tubes. Adequate hemostasis was obtained. The vaginal angles were sutured in place to the uterosacral ligaments bilaterally using figure of X sutures of 0-Vicryl.  The vaginal mucosa was then closed with interrupted figure-of-eight suture of 0 Vicryl. Adequate hemostasis was obtained via this technique.     Attention was turned to the abdomen. The abdomen was re-insufflated with CO2. Hemostasis of pedicles noted. All instruments were removed from the abdomen, and abdomen was desufflated. Cystoscopy was completed with above findings. All trocars were removed. Laparoscopic port sites were closed with 4-0 Monocryl and sterile  dressing. The lap, sponge and instrument count were correct. The patient tolerated the procedure well and was transferred to recovery in stable condition. Dr. Brady was present and scrubbed for the entire procedure.    Complications:            None apparent.     Johana Sandy MD, MHS  Ob/Gyn PGY4  02/21/20     I was present and scrubbed throughout the procedure,  I agree with the note above  Kalani Brady MD

## 2020-02-21 NOTE — DISCHARGE INSTRUCTIONS
Same-Day Surgery   Adult Discharge Orders & Instructions     For 24 hours after surgery:  1. Get plenty of rest.  A responsible adult must stay with you for at least 24 hours after you leave the hospital.   2. Pain medication can slow your reflexes. Do not drive or use heavy equipment.  If you have weakness or tingling, don't drive or use heavy equipment until this feeling goes away.  3. Mixing alcohol and pain medication can cause dizziness and slow your breathing. It can even be fatal. Do not drink alcohol while taking pain medication.  4. Avoid strenuous or risky activities.  Ask for help when climbing stairs.   5. You may feel lightheaded.  If so, sit for a few minutes before standing.  Have someone help you get up.   6. If you have nausea (feel sick to your stomach), drink only clear liquids such as apple juice, ginger ale, broth or 7-Up.  Rest may also help.  Be sure to drink enough fluids.  Move to a regular diet as you feel able. Take pain medications with a small amount of solid food, such as toast or crackers, to avoid nausea.   7. A slight fever is normal. Call the doctor if your fever is over 100 F (37.7 C) (taken under the tongue) or lasts longer than 24 hours.  8. You may have a dry mouth, muscle aches, trouble sleeping or a sore throat.  These symptoms should go away after 24 hours.  9. Do not make important or legal decisions.   Pain Management:      1. Take pain medication (if prescribed) for pain as directed by your physician.        2. WARNING: If the pain medication you have been prescribed contains Tylenol  (acetaminophen), DO NOT take additional doses of Tylenol (acetaminophen).     Call your doctor for any of the followin.  Signs of infection (fever, growing tenderness at the surgery site, severe pain, a large amount of drainage or bleeding, foul-smelling drainage, redness, swelling).    2.  It has been over 8 to 10 hours since surgery and you are still not able to urinate (pee).    3.   Headache for over 24 hours.    4.  Numbness, tingling or weakness the day after surgery (if you had spinal anesthesia).  To contact a doctor, call _____________________________________ or:      [phone] and ask for the Resident On Call for:          __________________________________________ (answered 24 hours a day)      Emergency Department:  [facility]: [phone]  [facility]: [phone]               Rev. 10/2014   Discharge Instructions: Vaginal or Abdominal Hysterectomy   Healing takes time. How much time depends on your health and the type of surgery you had. During your recovery time, you can do a lot to make sure that you regain your health and energy.    Diet    Eat a well-balanced diet with lots of protein, fruits, vegetables, and whole grains.  Avoid spicy or greasy foods.     Drink plenty of fluids - at least 8 tall glasses of water a day. Water is best. Limit caffeine (coffee, tea, soda) to help prevent constipation (hard stools that are difficult to pass).    If you are constipated, you may take one of these medications from the drug store: Docusate (Colace), Docusate with Casanthranol (Yoko-Colace), Psyllium (Metamucil), or Milk of Magnesia. Follow to directions on the label.  Activity    Get plenty of rest at first. Slowly return to your normal routine. Several short walks each day will help. It is okay to climb stairs, but use the handrail in case you feel dizzy.     After 2 weeks, you may start gentle exercises. Listen to your body. If you feel tired, sore, or have backaches, you may be doing too much too soon.     Do not drive until you can step on the brakes without pain.     Do not use tampons, douche, or have sex (intercourse) until you see your doctor.     For the next 6 weeks, do not lift anything greater than 15 pounds and avoid heavy exercise.  Pain    Your pain should decrease over the next 2-3 weeks.     You may feel sore after mild  exercise.    Take your pain medication as prescribed by your doctor.   Caring for your Incisions (if applicable)    You may see some fluid draining from your incision(s). Wear the bandage(s) until it stops.     Keep the area clean and dry. Wash with soap and water.    Do not use lotions or powders near the incision(s).    If you have:  o Steri-strips (small pieces of tape) - they should fall off on their own in 7-10 days. If that time has passed and they are still in place, you may remove them.  o Staples - These will be removed at your next visit.   o Dermabond (medical glue) - Leave it in place until it wears off.   Bathing  Take care to avoid slips and falls. Gently pat your incisions dry after bathing.    After Abdominal Hysterectomy (surgery through the belly): You may shower. Avoid tub baths and swimming for two weeks, of until your incision heals.     After Vaginal Hysterectomy (surgery through the vagina): You may bathe or shower. If you had surgery to repair the vaginal wall, it may helps to soak in a warm bath for 20 minutes, twice a day. This will speed healing and reduce tenderness.   What to expect after surgery    A small amount of blood or fluid coming from your vagina for several weeks. Wear pads as needed.     Stitches poking out of the vagina.     Stitches passing out of the vagina (they will look like tiny threads).    Most stitches dissolve within 3 months.     Feeling numb around your stitches. This should go away in less than a year.     Feeling dizzy or light-headed. Hot flashes, trouble sleeping, sudden mood swings, and irritability. If side effects become a problem, notify your doctor.     If you had a vaginal repair, you may feel tugging in the vagina. This is a normal part of healing.   Call your doctor if you have:    Severe chills and a fever of 100.4 degrees F or higher, taken under the tongue.     Bright red blood coming out of the vagina - enough to soak one pad an hour.     Large  clots coming out of the vagina.     Urine or vaginal fluid that smells bad.     Trouble urinating (peeing), burning when you go, or the need to go more often.     Calf pain and/or swelling in both legs.    Nausea (feeling sick to your stomach) or vomiting (throwing up).    Pain that you cannot control with the pain medication prescribed by your doctor.   Follow up with your doctor and return to the clinic in x  Make this appointment after you get home if it has not already been scheduled.                 REV. 5/12

## 2020-02-21 NOTE — BRIEF OP NOTE
[facility], [place]    Brief Operative Note    Pre-operative diagnosis:   Abnormal uterine bleeding (AUB) [N93.9]    Post-operative diagnosis   Same, s-p procedure below    Procedure(s):  HYSTERECTOMY, VAGINAL, LAPAROSCOPY-ASSISTED, BILATERAL SALPINGECTOMY  CYSTOSCOPY  REMOVAL OF ESSURE IMPLANTS    Surgeon(s) and Role:     * Kalani Brady MD - Primary     * Johana Sandy MD - Resident - Assisting      Anesthesia: General  IVF: 1300mL    Estimated blood loss: 200 ml  Drains: None  UOP: 100mL     Specimens: Uterus, cervix, bilateral fallopian tubes.   Findings:   On EUA, patient had an 8w sized mobile anteverted uterus. Normal external genitalia, vagina, cervix. On laparoscopy, no injury to underlying viscera. Her liver, bowel, tubes and ovaries were grossly normal in appearance.  There were not adhesions in the pelvis.  Bilateral ureters were visualized and noted to be clear of the area of surgical excision. On cystoscopy, efflux from bilateral ureters visualized. Normal bladder mucosa without injury. Surgical sites hemostatic at the end of the case.     Complications: None apparent.   Implants: None      Johana Sandy MD, S  Ob/Gyn PGY4  02/21/20

## 2020-02-21 NOTE — ANESTHESIA PROCEDURE NOTES
Peripheral Nerve Block Procedure Note    Staff:     Anesthesiologist:  Jack Avila MD  Location: Pre-op  Procedure Start/Stop TImes:      2/21/2020 8:52 AM     2/21/2020 8:59 AM    patient identified, IV checked, site marked, risks and benefits discussed, informed consent, monitors and equipment checked, pre-op evaluation, at physician/surgeon's request and post-op pain management      Correct Patient: Yes      Correct Position: Yes      Correct Site: Yes      Correct Procedure: Yes      Correct Laterality:  Yes    Site Marked:  Yes  Procedure details:     Procedure:  TAP    Laterality:  Bilateral    Position:  Supine    Sterile Prep: chloraprep, mask and sterile gloves      Local skin infiltration:  None    Needle gauge:  21    Needle length (inches):  4    Ultrasound: Yes      Ultrasound used to identify targeted nerve, plexus, or vascular structure and placed a needle adjacent to it      Permanent Image entered into patiient's record      Abnormal pain on injection: No      Blood Aspirated: No      Paresthesias:  No    Bleeding at site: No      Test dose negative for signs of intravascular injection: Yes      Bolus via:  Needle    Infusion Method:  Single Shot    Complications:  None  Assessment/Narrative:     Injection made incrementally with aspirations every (mL):  5

## 2020-02-21 NOTE — ANESTHESIA CARE TRANSFER NOTE
Patient: Fay Amos    Procedure(s):  HYSTERECTOMY, VAGINAL, LAPAROSCOPY-ASSISTED, BILATERAL SALPINGECTOMY  CYSTOSCOPY  REMOVAL OF ESSURE IMPLANTS    Diagnosis: Abnormal uterine bleeding (AUB) [N93.9]  Diagnosis Additional Information: No value filed.    Anesthesia Type:   General     Note:  Airway :Face Mask  Patient transferred to:PACU  Handoff Report: Identifed the Patient, Identified the Reponsible Provider, Reviewed the pertinent medical history, Discussed the surgical course, Reviewed Intra-OP anesthesia mangement and issues during anesthesia, Set expectations for post-procedure period and Allowed opportunity for questions and acknowledgement of understanding      Vitals: (Last set prior to Anesthesia Care Transfer)    CRNA VITALS  2/21/2020 1132 - 2/21/2020 1206      2/21/2020             Pulse:  91    SpO2:  100 %    Resp Rate (observed):  (!) 1                Electronically Signed By: MARTIR Mcdonald CRNA  February 21, 2020  12:06 PM

## 2020-02-25 LAB — COPATH REPORT: NORMAL

## 2020-02-27 ENCOUNTER — TELEPHONE (OUTPATIENT)
Dept: OBGYN | Facility: CLINIC | Age: 50
End: 2020-02-27

## 2020-02-27 NOTE — TELEPHONE ENCOUNTER
Call to patient to review benign pathology and follow-up after surgery.  I reached her voicemail and left a non-detailed message.  I will send a [employer] message with the results.  Kalani Brady MD

## 2020-03-05 ENCOUNTER — TELEPHONE (OUTPATIENT)
Dept: OBGYN | Facility: CLINIC | Age: 50
End: 2020-03-05

## 2020-03-05 NOTE — TELEPHONE ENCOUNTER
Spoke with patient as post-op check next week was rescheduled to April.  She is doing well, no pain, not taking any medications.  Glue is still on the laparoscopic incision sites and reassuring patient it was ok to pick off.  Reviewed the benign pathology report.  Will see patient on 4/10/20.  Kalani Brady MD

## 2020-03-11 ENCOUNTER — HEALTH MAINTENANCE LETTER (OUTPATIENT)
Age: 50
End: 2020-03-11

## 2020-10-29 ENCOUNTER — OFFICE VISIT (OUTPATIENT)
Dept: OBGYN | Facility: CLINIC | Age: 50
End: 2020-10-29
Attending: OBSTETRICS & GYNECOLOGY
Payer: COMMERCIAL

## 2020-10-29 VITALS
SYSTOLIC BLOOD PRESSURE: 138 MMHG | BODY MASS INDEX: 27.38 KG/M2 | WEIGHT: 180 LBS | DIASTOLIC BLOOD PRESSURE: 82 MMHG | HEART RATE: 93 BPM

## 2020-10-29 DIAGNOSIS — M62.838 MUSCLE SPASM: ICD-10-CM

## 2020-10-29 DIAGNOSIS — N94.12 DEEP DYSPAREUNIA IN FEMALE: Primary | ICD-10-CM

## 2020-10-29 PROCEDURE — G0463 HOSPITAL OUTPT CLINIC VISIT: HCPCS

## 2020-10-29 PROCEDURE — 99214 OFFICE O/P EST MOD 30 MIN: CPT | Performed by: OBSTETRICS & GYNECOLOGY

## 2020-10-29 RX ORDER — DIAZEPAM 5 MG
TABLET ORAL
Qty: 2 TABLET | Refills: 1 | Status: SHIPPED | OUTPATIENT
Start: 2020-10-29

## 2020-10-29 ASSESSMENT — PAIN SCALES - GENERAL: PAINLEVEL: NO PAIN (0)

## 2020-10-29 NOTE — LETTER
10/29/2020       RE: Fay Amos  [address]  [place] [address]     Dear Colleague,    Thank you for referring your patient, Fay Amos, to the [facility] [place] at [facility]. Please see a copy of my visit note below.    51 yo with history of hysterectomy and bilateral salpingectomy in February, 2020 present with pain in left pelvis with intercourse and an episode of bleeding after intercourse.  Patient waited the full 8 weeks after surgery before having intercourse.  Initially she had no pain and felt she had recovered well.  Her second attempt at intercourse was uncomfortable, but she did not have to abruptly stop.  She noted some bleeding after that (this was in about May/[place]).  She has had no more bleeding but continues to have pain on the left side and it doesn't seem to be related to vaginal dryness or sexual position.  She denies pain with urination or any changes to bowel habits.     ROS: 10 point ROS neg other than the symptoms noted above in the HPI.    Past Medical History:   Diagnosis Date     Hypertension      Past Surgical History:   Procedure Laterality Date     CYSTOSCOPY N/A 2/21/2020    Procedure: CYSTOSCOPY;  Surgeon: Kalani Brady MD;  Location: [facility]     EYE SURGERY Left     Lazy eye correction     LAPAROSCOPIC ASSISTED HYSTERECTOMY VAGINAL Bilateral 2/21/2020    Procedure: HYSTERECTOMY, VAGINAL, LAPAROSCOPY-ASSISTED, BILATERAL SALPINGECTOMY;  Surgeon: Kalani Brady MD;  Location: [facility]     REMOVE INTRAUTERINE DEVICE N/A 2/21/2020    Procedure: REMOVAL OF ESSURE IMPLANTS;  Surgeon: Kalani Brady MD;  Location: [facility]     Physical exam:  /82   Pulse 93   Wt 81.6 kg (180 lb)   LMP 01/31/2020 (Approximate)   Breastfeeding No   BMI 27.38 kg/m    Gen'l: appears well, no distress  Abd: soft, NT, ND, incisions have healed very well  Vulva: normal, no lesions  Urethra: normal  Bladder: non-tender  Vagina:  pink, phsyiologic discharge present, cuff is well healed and a knot of suture is visible at right vaginal apex.  There is no tenderness or bleeding with palpation of the cuff with a q-tip.  Bimanual exam the cuff is intact.  There is significant tenderness with palpation of the pelvic floor musculature on the left, no tenderness on the right.  Cervix: surgically absent  Uterus: surgically absent  Adnexa: no palpable masses, NT  Rectum: Anus normal, no rectovaginal exam done    Assessment/Plan:  51 yo with pelvic floor muscle tenderness and dyspareunia     - discussed findings on exam and correlation with dyspareunia.  Discussed pelvic floor physical therapy and muscle relaxants.  Patient lives in [place] and isn't sure that pelvic floor PT would be available.  Desires trial of muscle relaxant.  - reviewed use of vaginal valium, risk of dependency with long term use so if does not resolve issue would recommend transition to trial of PT.  Patient agrees and prescription given.  Will follow-up PRN.      Again, thank you for allowing me to participate in the care of your patient.      Sincerely,    Kalani Brady MD

## 2020-10-29 NOTE — PROGRESS NOTES
49 yo with history of hysterectomy and bilateral salpingectomy in February, 2020 present with pain in left pelvis with intercourse and an episode of bleeding after intercourse.  Patient waited the full 8 weeks after surgery before having intercourse.  Initially she had no pain and felt she had recovered well.  Her second attempt at intercourse was uncomfortable, but she did not have to abruptly stop.  She noted some bleeding after that (this was in about May/June).  She has had no more bleeding but continues to have pain on the left side and it doesn't seem to be related to vaginal dryness or sexual position.  She denies pain with urination or any changes to bowel habits.     ROS: 10 point ROS neg other than the symptoms noted above in the HPI.    Past Medical History:   Diagnosis Date     Hypertension      Past Surgical History:   Procedure Laterality Date     CYSTOSCOPY N/A 2/21/2020    Procedure: CYSTOSCOPY;  Surgeon: Kalani Brady MD;  [facility]     EYE SURGERY Left     Lazy eye correction     LAPAROSCOPIC ASSISTED HYSTERECTOMY VAGINAL Bilateral 2/21/2020    Procedure: HYSTERECTOMY, VAGINAL, LAPAROSCOPY-ASSISTED, BILATERAL SALPINGECTOMY;  Surgeon: Kalani Brady MD;  [facility]     REMOVE INTRAUTERINE DEVICE N/A 2/21/2020    Procedure: REMOVAL OF ESSURE IMPLANTS;  Surgeon: Kalani Brady MD;  [facility]     Physical exam:  /82   Pulse 93   Wt 81.6 kg (180 lb)   LMP 01/31/2020 (Approximate)   Breastfeeding No   BMI 27.38 kg/m    Gen'l: appears well, no distress  Abd: soft, NT, ND, incisions have healed very well  Vulva: normal, no lesions  Urethra: normal  Bladder: non-tender  Vagina: pink, phsyiologic discharge present, cuff is well healed and a knot of suture is visible at right vaginal apex.  There is no tenderness or bleeding with palpation of the cuff with a q-tip.  Bimanual exam the cuff is intact.  There is significant tenderness with palpation of the pelvic floor  musculature on the left, no tenderness on the right.  Cervix: surgically absent  Uterus: surgically absent  Adnexa: no palpable masses, NT  Rectum: Anus normal, no rectovaginal exam done    Assessment/Plan:  49 yo with pelvic floor muscle tenderness and dyspareunia     - discussed findings on exam and correlation with dyspareunia.  Discussed pelvic floor physical therapy and muscle relaxants.  Patient lives in [place] and isn't sure that pelvic floor PT would be available.  Desires trial of muscle relaxant.  - reviewed use of vaginal valium, risk of dependency with long term use so if does not resolve issue would recommend transition to trial of PT.  Patient agrees and prescription given.  Will follow-up PRN.

## 2020-10-29 NOTE — NURSING NOTE
Chief Complaint   Patient presents with     Follow Up     C/O pain / bleeding with intercourse   Sommer Gunn LPN

## 2021-03-07 ENCOUNTER — HEALTH MAINTENANCE LETTER (OUTPATIENT)
Age: 51
End: 2021-03-07

## 2021-04-25 ENCOUNTER — HEALTH MAINTENANCE LETTER (OUTPATIENT)
Age: 51
End: 2021-04-25

## 2021-10-10 ENCOUNTER — HEALTH MAINTENANCE LETTER (OUTPATIENT)
Age: 51
End: 2021-10-10

## 2022-03-27 ENCOUNTER — HEALTH MAINTENANCE LETTER (OUTPATIENT)
Age: 52
End: 2022-03-27

## 2022-05-22 ENCOUNTER — HEALTH MAINTENANCE LETTER (OUTPATIENT)
Age: 52
End: 2022-05-22

## 2022-09-18 ENCOUNTER — HEALTH MAINTENANCE LETTER (OUTPATIENT)
Age: 52
End: 2022-09-18

## 2023-05-07 ENCOUNTER — HEALTH MAINTENANCE LETTER (OUTPATIENT)
Age: 53
End: 2023-05-07

## 2023-06-04 ENCOUNTER — HEALTH MAINTENANCE LETTER (OUTPATIENT)
Age: 53
End: 2023-06-04

## (undated) DEVICE — GOWN REINFORCED XXLG 9071

## (undated) DEVICE — SU VICRYL 2-0 CT-1 CR 8X18" UND J839D

## (undated) DEVICE — ENDO TROCAR SLEEVE KII ADV FIXATION 05X100MM CFS02

## (undated) DEVICE — SU VICRYL 0 CT-1 36" J346H

## (undated) DEVICE — NDL COUNTER 20CT 31142493

## (undated) DEVICE — ESU HOLDER LAP INST DISP PURPLE LONG 330MM H-PRO-330

## (undated) DEVICE — SYR 50ML LL W/O NDL 309653

## (undated) DEVICE — SU VICRYL 4-0 PS-2 18" UND J496H

## (undated) DEVICE — ENDO TROCAR FIRST ENTRY KII FIOS ADV FIX 12X100MM CFF73

## (undated) DEVICE — WIPES FOLEY CARE SURESTEP PROVON DFC100

## (undated) DEVICE — NDL SPINAL 22GA 3.5" QUINCKE 405181

## (undated) DEVICE — ADH SKIN CLOSURE PREMIERPRO EXOFIN 1.0ML 3470

## (undated) DEVICE — GLOVE PROTEXIS W/NEU-THERA 7.0  2D73TE70

## (undated) DEVICE — TUBING IRRIG CYSTO/BLADDER SET 81" LF 2C4040

## (undated) DEVICE — CATH TRAY FOLEY SURESTEP 16FR WDRAIN BAG STLK LATEX A300316A

## (undated) DEVICE — SU VICRYL 0 CT-1 CR 8X18" J740D

## (undated) DEVICE — Device

## (undated) DEVICE — PACK SET-UP STD 9102

## (undated) DEVICE — SUCTION MANIFOLD DORNOCH ULTRA CART UL-CL500

## (undated) DEVICE — SPONGE LAP 18X18" X8435

## (undated) DEVICE — GLOVE PROTEXIS BLUE W/NEU-THERA 7.5  2D73EB75

## (undated) DEVICE — SU VICRYL 0 CT-2 27" J334H

## (undated) DEVICE — LINEN TOWEL PACK X5 5464

## (undated) DEVICE — TUBING C02 INSUFFLATION LAP FILTER HEATER 6198

## (undated) DEVICE — TUBING SUCTION MEDI-VAC 1/4"X20' N620A

## (undated) DEVICE — ENDO TROCAR FIRST ENTRY KII FIOS ADV FIX 05X100MM CFF03

## (undated) DEVICE — SOL NACL 0.9% IRRIG 1000ML BOTTLE 2F7124

## (undated) DEVICE — ESU GROUND PAD UNIVERSAL W/O CORD

## (undated) DEVICE — ESU PENCIL W/HOLSTER E2350H

## (undated) DEVICE — TUBING SMOKE EVAC 1CMX3M SEA3710

## (undated) DEVICE — ESU LIGASURE LAPAROSCOPIC BLUNT TIP SEALER 5MMX37CM LF1837

## (undated) DEVICE — BLADE KNIFE SURG 11 371111

## (undated) DEVICE — PAD PERI INDIV WRAP 11" 2022A

## (undated) DEVICE — LINEN GOWN X4 5410

## (undated) DEVICE — NDL INSUFFLATION 13GA 120MM C2201

## (undated) DEVICE — PAD CHUX UNDERPAD 30X36" P3036C

## (undated) DEVICE — SUCTION TIP YANKAUER W/O VENT K86

## (undated) DEVICE — STRAP KNEE/BODY 31143004

## (undated) DEVICE — SPONGE RAY-TEC 4X8" 7318

## (undated) RX ORDER — ONDANSETRON 2 MG/ML
INJECTION INTRAMUSCULAR; INTRAVENOUS
Status: DISPENSED
Start: 2020-02-21

## (undated) RX ORDER — HYDROMORPHONE HYDROCHLORIDE 1 MG/ML
INJECTION, SOLUTION INTRAMUSCULAR; INTRAVENOUS; SUBCUTANEOUS
Status: DISPENSED
Start: 2020-02-21

## (undated) RX ORDER — FENTANYL CITRATE 50 UG/ML
INJECTION, SOLUTION INTRAMUSCULAR; INTRAVENOUS
Status: DISPENSED
Start: 2020-02-21

## (undated) RX ORDER — LIDOCAINE HYDROCHLORIDE 20 MG/ML
INJECTION, SOLUTION EPIDURAL; INFILTRATION; INTRACAUDAL; PERINEURAL
Status: DISPENSED
Start: 2020-02-21

## (undated) RX ORDER — KETOROLAC TROMETHAMINE 30 MG/ML
INJECTION, SOLUTION INTRAMUSCULAR; INTRAVENOUS
Status: DISPENSED
Start: 2020-02-21

## (undated) RX ORDER — OXYCODONE HYDROCHLORIDE 5 MG/1
TABLET ORAL
Status: DISPENSED
Start: 2020-02-21

## (undated) RX ORDER — ACETAMINOPHEN 325 MG/1
TABLET ORAL
Status: DISPENSED
Start: 2020-02-21

## (undated) RX ORDER — PHENYLEPHRINE HCL IN 0.9% NACL 1 MG/10 ML
SYRINGE (ML) INTRAVENOUS
Status: DISPENSED
Start: 2020-02-21

## (undated) RX ORDER — PHENAZOPYRIDINE HYDROCHLORIDE 200 MG/1
TABLET, FILM COATED ORAL
Status: DISPENSED
Start: 2020-02-21

## (undated) RX ORDER — PROPOFOL 10 MG/ML
INJECTION, EMULSION INTRAVENOUS
Status: DISPENSED
Start: 2020-02-21

## (undated) RX ORDER — CEFAZOLIN SODIUM 2 G/100ML
INJECTION, SOLUTION INTRAVENOUS
Status: DISPENSED
Start: 2020-02-21

## (undated) RX ORDER — GABAPENTIN 300 MG/1
CAPSULE ORAL
Status: DISPENSED
Start: 2020-02-21

## (undated) RX ORDER — DEXAMETHASONE SODIUM PHOSPHATE 4 MG/ML
INJECTION, SOLUTION INTRA-ARTICULAR; INTRALESIONAL; INTRAMUSCULAR; INTRAVENOUS; SOFT TISSUE
Status: DISPENSED
Start: 2020-02-21